# Patient Record
Sex: FEMALE | Race: WHITE | NOT HISPANIC OR LATINO | URBAN - METROPOLITAN AREA
[De-identification: names, ages, dates, MRNs, and addresses within clinical notes are randomized per-mention and may not be internally consistent; named-entity substitution may affect disease eponyms.]

---

## 2023-10-20 ENCOUNTER — OFFICE VISIT (OUTPATIENT)
Dept: URGENT CARE | Facility: CLINIC | Age: 63
End: 2023-10-20
Payer: COMMERCIAL

## 2023-10-20 VITALS
BODY MASS INDEX: 31.34 KG/M2 | HEART RATE: 85 BPM | OXYGEN SATURATION: 98 % | TEMPERATURE: 98.2 F | WEIGHT: 195 LBS | SYSTOLIC BLOOD PRESSURE: 125 MMHG | HEIGHT: 66 IN | RESPIRATION RATE: 18 BRPM | DIASTOLIC BLOOD PRESSURE: 60 MMHG

## 2023-10-20 DIAGNOSIS — K11.20 SIALADENITIS: Primary | ICD-10-CM

## 2023-10-20 PROCEDURE — 99213 OFFICE O/P EST LOW 20 MIN: CPT

## 2023-10-20 RX ORDER — BUSPIRONE HYDROCHLORIDE 10 MG/1
10 TABLET ORAL 3 TIMES DAILY
COMMUNITY

## 2023-10-20 RX ORDER — HYDROCHLOROTHIAZIDE 12.5 MG/1
12.5 TABLET ORAL DAILY
COMMUNITY

## 2023-10-20 RX ORDER — AMLODIPINE BESYLATE 2.5 MG/1
2.5 TABLET ORAL DAILY
COMMUNITY

## 2023-10-20 RX ORDER — AMOXICILLIN AND CLAVULANATE POTASSIUM 875; 125 MG/1; MG/1
1 TABLET, FILM COATED ORAL EVERY 12 HOURS SCHEDULED
Qty: 20 TABLET | Refills: 0 | Status: SHIPPED | OUTPATIENT
Start: 2023-10-20 | End: 2023-10-30

## 2023-10-20 RX ORDER — AMLODIPINE BESYLATE 5 MG/1
5 TABLET ORAL DAILY
COMMUNITY

## 2023-10-20 RX ORDER — BUSPIRONE HYDROCHLORIDE 7.5 MG/1
7.5 TABLET ORAL 3 TIMES DAILY
COMMUNITY

## 2023-10-20 RX ORDER — ESCITALOPRAM OXALATE 5 MG/1
5 TABLET ORAL DAILY
COMMUNITY

## 2023-10-20 NOTE — PROGRESS NOTES
[facility]        NAME: Nara Vincent is a 70 y.o. female  : 1960    MRN: [id number]  DATE: 2023  TIME: 10:05 AM    Assessment and Plan   Sialadenitis [K11.20]  1. Sialadenitis  amoxicillin-clavulanate (AUGMENTIN) 875-125 mg per tablet        Clinical findings concerning for parotitis/sialadenitis. Will treat with Augmentin and encouraged continued supportive measures. Follow up with PCP in 3-5 days or proceed to emergency department for worsening symptoms. Patient verbalized understanding of instructions given. Patient Instructions     Patient Instructions     Take antibiotic as prescribed  Warm compresses  Massage  Sour candies  Follow up with PCP in 3-5 days. Proceed to  ER if symptoms worsen. Parotid Duct Obstruction   WHAT YOU NEED TO KNOW:   What is a parotid duct obstruction? A parotid duct obstruction (PDO) is when your parotid gland is blocked. Your parotid glands are found in your cheeks, over your jaw and in front of your ears. They release saliva into your mouth through the parotid duct. Saliva helps break down food and protect your teeth. What causes a PDO? Your parotid duct may narrow or become blocked because of an injury or infection. Minerals in your saliva can harden and form a stone that blocks the duct. Stones may form if you do not produce enough saliva. You also may have a mass that blocks the duct. What are the signs and symptoms of a PDO? Swelling and pain that is worse when you eat    Pus that drains from the gland into your mouth    Bad breath    Tooth decay    How is a PDO diagnosed? Your healthcare provider will examine your face and mouth. Your provider will feel your parotid gland to learn how swollen it is. You may need any of the following:  Blood tests  will show infection and which germ is causing it. A pus culture  is a test of the pus draining from your parotid gland.  The culture is used to find the cause of your infection. An ultrasound  uses sound waves to show parotid gland blockage. A CT scan , or CAT scan, takes pictures of your parotid gland. The pictures may show a blockage, mass, or infection. You may be given a dye before the pictures are taken to help healthcare providers see the gland better. Tell the healthcare provider if you have ever had an allergic reaction to contrast dye. A biopsy , or fine needle aspiration, may show what is causing your blockage. Healthcare providers use a small needle to take fluid or tissue out of the gland to be tested. How is a PDO treated? Medicines:      Antibiotics  are used to treat an infection caused by bacteria. NSAIDs  help decrease swelling and pain or fever. This medicine is available with or without a doctor's order. NSAIDs can cause stomach bleeding or kidney problems in certain people. If you take blood thinner medicine, always ask your healthcare provider if NSAIDs are safe for you. Always read the medicine label and follow directions. A parotid duct dilation  may be needed to widen or open the duct. Healthcare providers may use a tube or balloon. Ask your healthcare provider for more information on dilation. Stone removal  may be needed to remove a stone that is blocking your parotid gland. Healthcare providers may use sound waves to break up the stone and then flush out the pieces. A basket may also be used to remove the stone from the duct. Parotid gland removal  may be needed if other treatments do not work. Healthcare providers remove all or part of your parotid gland through surgery. How can I manage my symptoms? Keep your mouth moist.  Suck on hard or sour candy to get your saliva to flow. Massage the area of your swollen gland. This may help relieve swelling and pain. Apply heat  on your swollen gland for 20 to 30 minutes every 2 hours for as many days as directed. Heat helps decrease pain and swelling.     How can I help prevent a PDO? Drink liquids as directed. Ask your healthcare provider how much liquid to drink each day and which liquids are best for you. Brush and floss your teeth. Good dental hygiene may prevent obstruction and infection. When should I seek immediate care? You have severe pain. You cannot move part of your face. When should I call my doctor? You have a fever. The skin over your parotid gland is red and warm. Your pain and swelling do not go away, or they get worse. Both sides of your face are swollen. Your mouth and eyes are very dry. You have questions or concerns about your condition or care. CARE AGREEMENT:   You have the right to help plan your care. Learn about your health condition and how it may be treated. Discuss treatment options with your healthcare providers to decide what care you want to receive. You always have the right to refuse treatment. The above information is an  only. It is not intended as medical advice for individual conditions or treatments. Talk to your doctor, nurse or pharmacist before following any medical regimen to see if it is safe and effective for you. © Copyright Radha Mackey 2023 Information is for End User's use only and may not be sold, redistributed or otherwise used for commercial purposes. Chief Complaint     Chief Complaint   Patient presents with    lump on L side of neck     Pt states she touched her neck and noticed a bump on her L side. 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 Pt states she took Benadryl and earrings out to see if that helped. History of Present Illness       68-year-old female with a past medical history significant for hypertension presents with complaints of left-sided neck swelling and tenderness x2 days. Patient reports that she noticed swelling incidentally and only states pain with palpation. Denies fever, chills, or URI symptoms.   No known sick contacts or exposures. She did take a dose of OTC Benadryl as well as remove earrings, unsure if related to symptoms. Reports improvement of swelling since yesterday. No SOB or wheezing. Review of Systems   Review of Systems   Constitutional:  Negative for chills and fever. HENT:  Positive for facial swelling. Negative for congestion, ear discharge, ear pain, rhinorrhea, sore throat, trouble swallowing and voice change. Eyes:  Negative for discharge. Respiratory:  Negative for cough and shortness of breath. Cardiovascular:  Negative for chest pain. Gastrointestinal:  Negative for abdominal pain, diarrhea, nausea and vomiting. Skin:  Negative for rash. Neurological:  Negative for headaches.          Current Medications       Current Outpatient Medications:     amLODIPine (NORVASC) 2.5 mg tablet, Take 2.5 mg by mouth daily, Disp: , Rfl:     amLODIPine (NORVASC) 5 mg tablet, Take 5 mg by mouth daily, Disp: , Rfl:     amoxicillin-clavulanate (AUGMENTIN) 875-125 mg per tablet, Take 1 tablet by mouth every 12 (twelve) hours for 10 days, Disp: 20 tablet, Rfl: 0    busPIRone (BUSPAR) 10 mg tablet, Take 10 mg by mouth 3 (three) times a day, Disp: , Rfl:     busPIRone (BUSPAR) 7.5 mg tablet, Take 7.5 mg by mouth 3 (three) times a day, Disp: , Rfl:     escitalopram (LEXAPRO) 5 mg tablet, Take 5 mg by mouth daily, Disp: , Rfl:     hydrochlorothiazide (HYDRODIURIL) 12.5 mg tablet, Take 12.5 mg by mouth daily, Disp: , Rfl:     Current Allergies     Allergies as of 10/20/2023    (No Known Allergies)            The following portions of the patient's history were reviewed and updated as appropriate: allergies, current medications, past family history, past medical history, past social history, past surgical history and problem list.     Past Medical History:   Diagnosis Date    Anxiety     Hypertension        Past Surgical History:   Procedure Laterality Date    APPENDECTOMY       SECTION CHOLECYSTECTOMY         History reviewed. No pertinent family history. Medications have been verified. Objective   /60   Pulse 85   Temp 98.2 °F (36.8 °C)   Resp 18   Ht 5' 6" (1.676 m)   Wt 88.5 kg (195 lb)   SpO2 98%   BMI 31.47 kg/m²   No LMP recorded. Patient is postmenopausal.       Physical Exam     Physical Exam  Vitals and nursing note reviewed. Constitutional:       General: She is not in acute distress. HENT:      Head: Normocephalic. Jaw: Swelling present. Right Ear: Tympanic membrane, ear canal and external ear normal.      Left Ear: Tympanic membrane, ear canal and external ear normal.      Nose: Nose normal.      Mouth/Throat:      Mouth: Mucous membranes are moist.      Pharynx: Oropharynx is clear. Cardiovascular:      Rate and Rhythm: Normal rate and regular rhythm. Heart sounds: Normal heart sounds. Pulmonary:      Effort: Pulmonary effort is normal. No respiratory distress. Breath sounds: Normal breath sounds. No stridor. No wheezing, rhonchi or rales. Skin:     General: Skin is warm and dry. Neurological:      Mental Status: She is alert and oriented to person, place, and time. Gait: Gait is intact.    Psychiatric:         Mood and Affect: Mood normal.         Behavior: Behavior normal.

## 2023-10-20 NOTE — PATIENT INSTRUCTIONS
Take antibiotic as prescribed  Warm compresses  Massage  Sour candies  Follow up with PCP in 3-5 days. Proceed to  ER if symptoms worsen. Parotid Duct Obstruction   WHAT YOU NEED TO KNOW:   What is a parotid duct obstruction? A parotid duct obstruction (PDO) is when your parotid gland is blocked. Your parotid glands are found in your cheeks, over your jaw and in front of your ears. They release saliva into your mouth through the parotid duct. Saliva helps break down food and protect your teeth. What causes a PDO? Your parotid duct may narrow or become blocked because of an injury or infection. Minerals in your saliva can harden and form a stone that blocks the duct. Stones may form if you do not produce enough saliva. You also may have a mass that blocks the duct. What are the signs and symptoms of a PDO? Swelling and pain that is worse when you eat    Pus that drains from the gland into your mouth    Bad breath    Tooth decay    How is a PDO diagnosed? Your healthcare provider will examine your face and mouth. Your provider will feel your parotid gland to learn how swollen it is. You may need any of the following:  Blood tests  will show infection and which germ is causing it. A pus culture  is a test of the pus draining from your parotid gland. The culture is used to find the cause of your infection. An ultrasound  uses sound waves to show parotid gland blockage. A CT scan , or CAT scan, takes pictures of your parotid gland. The pictures may show a blockage, mass, or infection. You may be given a dye before the pictures are taken to help healthcare providers see the gland better. Tell the healthcare provider if you have ever had an allergic reaction to contrast dye. A biopsy , or fine needle aspiration, may show what is causing your blockage. Healthcare providers use a small needle to take fluid or tissue out of the gland to be tested. How is a PDO treated?    Medicines: Antibiotics  are used to treat an infection caused by bacteria. NSAIDs  help decrease swelling and pain or fever. This medicine is available with or without a doctor's order. NSAIDs can cause stomach bleeding or kidney problems in certain people. If you take blood thinner medicine, always ask your healthcare provider if NSAIDs are safe for you. Always read the medicine label and follow directions. A parotid duct dilation  may be needed to widen or open the duct. Healthcare providers may use a tube or balloon. Ask your healthcare provider for more information on dilation. Stone removal  may be needed to remove a stone that is blocking your parotid gland. Healthcare providers may use sound waves to break up the stone and then flush out the pieces. A basket may also be used to remove the stone from the duct. Parotid gland removal  may be needed if other treatments do not work. Healthcare providers remove all or part of your parotid gland through surgery. How can I manage my symptoms? Keep your mouth moist.  Suck on hard or sour candy to get your saliva to flow. Massage the area of your swollen gland. This may help relieve swelling and pain. Apply heat  on your swollen gland for 20 to 30 minutes every 2 hours for as many days as directed. Heat helps decrease pain and swelling. How can I help prevent a PDO? Drink liquids as directed. Ask your healthcare provider how much liquid to drink each day and which liquids are best for you. Brush and floss your teeth. Good dental hygiene may prevent obstruction and infection. When should I seek immediate care? You have severe pain. You cannot move part of your face. When should I call my doctor? You have a fever. The skin over your parotid gland is red and warm. Your pain and swelling do not go away, or they get worse. Both sides of your face are swollen. Your mouth and eyes are very dry.     You have questions or concerns about your condition or care. CARE AGREEMENT:   You have the right to help plan your care. Learn about your health condition and how it may be treated. Discuss treatment options with your healthcare providers to decide what care you want to receive. You always have the right to refuse treatment. The above information is an  only. It is not intended as medical advice for individual conditions or treatments. Talk to your doctor, nurse or pharmacist before following any medical regimen to see if it is safe and effective for you. © Copyright Annemarie Mar 2023 Information is for End User's use only and may not be sold, redistributed or otherwise used for commercial purposes.